# Patient Record
(demographics unavailable — no encounter records)

---

## 2024-10-09 NOTE — HEALTH RISK ASSESSMENT
[Good] : ~his/her~  mood as  good [Yes] : Yes [Never] : Never [Patient reported mammogram was normal] : Patient reported mammogram was normal [None] : None [With Family] : lives with family [Employed] : employed [] :  [# Of Children ___] : has [unfilled] children [Feels Safe at Home] : Feels safe at home [Fully functional (bathing, dressing, toileting, transferring, walking, feeding)] : Fully functional (bathing, dressing, toileting, transferring, walking, feeding) [Fully functional (using the telephone, shopping, preparing meals, housekeeping, doing laundry, using] : Fully functional and needs no help or supervision to perform IADLs (using the telephone, shopping, preparing meals, housekeeping, doing laundry, using transportation, managing medications and managing finances) [Smoke Detector] : smoke detector [Safety elements used in home] : safety elements used in home [Seat Belt] :  uses seat belt [de-identified] : rare [Change in mental status noted] : No change in mental status noted [Language] : denies difficulty with language [High Risk Behavior] : no high risk behavior [Reports changes in hearing] : Reports no changes in hearing [Reports changes in vision] : Reports no changes in vision [Reports changes in dental health] : Reports no changes in dental health [MammogramDate] : 09/24 [PapSmearDate] : 09/24 [FreeTextEntry2] : rehab at home

## 2024-10-09 NOTE — HISTORY OF PRESENT ILLNESS
[FreeTextEntry1] : CPE [de-identified] : 45 yo woman for annual exam  h/o Cavitary TB/M[place]ISON/Bronchiectasis treated from '15-'16  Hep B carrier genotype A low levels not on treatmt Sees ID, Hepatology, Pulm Dr Ferreira h/o skiing accident s/p tib/fib fracture, complex course, doing well onw. ALLERGIC TO NSIADS. 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

## 2024-10-09 NOTE — ASSESSMENT
[FreeTextEntry1] : Pt as outlined. Doing well. Back to work as physical therapist. routine labs Flu shot now. All else uptodate.